# Patient Record
Sex: MALE | Race: WHITE
[De-identification: names, ages, dates, MRNs, and addresses within clinical notes are randomized per-mention and may not be internally consistent; named-entity substitution may affect disease eponyms.]

---

## 2017-03-24 ENCOUNTER — HOSPITAL ENCOUNTER (EMERGENCY)
Dept: HOSPITAL 74 - JERFT | Age: 22
Discharge: HOME | End: 2017-03-24
Payer: COMMERCIAL

## 2017-03-24 VITALS — SYSTOLIC BLOOD PRESSURE: 145 MMHG | TEMPERATURE: 98 F | HEART RATE: 75 BPM | DIASTOLIC BLOOD PRESSURE: 75 MMHG

## 2017-03-24 VITALS — BODY MASS INDEX: 25 KG/M2

## 2017-03-24 DIAGNOSIS — S60.511A: Primary | ICD-10-CM
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
## 2017-03-24 NOTE — PDOC
History of Present Illness





- General


Chief Complaint: Injury


Stated Complaint: RT HAND INJURY


Time Seen by Provider: 03/24/17 17:28


History Source: Patient


Exam Limitations: No Limitations





- History of Present Illness


Initial Comments: 





03/24/17 17:47


21 yr male states he trip and fell and cut his hand . Pt denies trauma no LOC. 

tetanus UTD.


Occurred: reports: just prior to arrival


Severity: reports: mild


Method of Injury: Yes: fall


Loss of Consciousness: no loss of consciousness





Past History





- Past Medical History


Allergies/Adverse Reactions: 


 Allergies











Allergy/AdvReac Type Severity Reaction Status Date / Time


 


No Known Allergies Allergy   Verified 03/24/17 17:20











Home Medications: 


Ambulatory Orders





NK [No Known Home Medication]  03/24/17 








Other medical history: denies





- Psycho/Social/Smoking Cessation Hx


Suicidal Ideation: No


Smoking History: Never smoked


Information on smoking cessation initiated: No


Hx Alcohol Use: Yes (Marijuana)


Drug/Substance Use Hx: No


Substance Use Type: Marijuana





Trauma Specific PMHX





- Complaint Specific PMHX


Arthritis: No


Back Injury: No


Neck Injury: No


Hx Sacro Iliac Joint Dysfunction: No





**Review of Systems





- Review of Systems


Able to Perform ROS?: Yes


Is the patient limited English proficient: No


Constitutional: No: Symptoms Reported


HEENTM: No: Symptoms Reported


Respiratory: No: Symptoms reported


Cardiac (ROS): No: Symptoms Reported


ABD/GI: No: Symptoms Reported


: No: Symptoms Reported


Musculoskeletal: Yes: See HPI


Integumentary: Yes: See HPI





*Physical Exam





- Vital Signs


 Last Vital Signs











Temp Pulse Resp BP Pulse Ox


 


 98 F   75   18   145/75   99 


 


 03/24/17 17:17  03/24/17 17:17  03/24/17 17:17  03/24/17 17:17  03/24/17 17:17














- Physical Exam


General Appearance: Yes: Nourished, Appropriately Dressed


HEENT: positive: EOMI, RONNI, Normal ENT Inspection, TMs Normal, Pharynx Normal


Neck: positive: Supple.  negative: Tender


Respiratory/Chest: positive: Lungs Clear, Normal Breath Sounds


Cardiovascular: positive: Regular Rhythm, Regular Rate


Gastrointestinal/Abdominal: positive: Normal Bowel Sounds, Soft


Musculoskeletal: positive: Normal Inspection


Extremity: positive: Normal Capillary Refill, Normal Inspection, Normal Range 

of Motion, Other (no bony tenderness, FROM nv intact)


Integumentary: positive: Normal Color, Other (right hand over the MCP 5th digit 

with superficial abrasions)


Neurologic: positive: Fully Oriented, Alert, Normal Mood/Affect, Normal Response

, Motor Strength 5/5





Procedures





- Laceration/Wound Repair


  ** Right Dorsal 5th digit Hand


Wound Length: to 2.5 cm


Wound Explored: clean


Wound's Depth, Shape: superficial


Betadine Prep: Yes


Sterile Dressing Applied: Yes


Progress: 





03/24/17 17:52


bacitracin and bandaid placed





Medical Decision Making





- Medical Decision Making





03/24/17 17:50


cc: trip and fall cut right hand on the cement


pt has FROM no pain nv intact


refused xray 


wound cleaned bacitracin and bandaid placed





*DC/Admit/Observation/Transfer


Diagnosis at time of Disposition: 


 Abrasion





- Discharge Dispostion


Disposition: HOME


Condition at time of disposition: Improved





- Patient Instructions


Additional Instructions: 


keep clean and dry' apply bacitracin or any triple antibiotic ointment twice 

daily until healed


take motrin for any pain 





return to ER if any worsening pain

## 2018-04-24 ENCOUNTER — HOSPITAL ENCOUNTER (EMERGENCY)
Dept: HOSPITAL 74 - JERFT | Age: 23
Discharge: LEFT BEFORE DISCHARGE | End: 2018-04-24
Payer: COMMERCIAL

## 2018-04-24 VITALS — BODY MASS INDEX: 25 KG/M2

## 2018-04-24 VITALS — DIASTOLIC BLOOD PRESSURE: 83 MMHG | HEART RATE: 67 BPM | TEMPERATURE: 98 F | SYSTOLIC BLOOD PRESSURE: 119 MMHG

## 2018-04-24 DIAGNOSIS — Z53.21: Primary | ICD-10-CM
